# Patient Record
Sex: MALE | Race: OTHER | HISPANIC OR LATINO | URBAN - METROPOLITAN AREA
[De-identification: names, ages, dates, MRNs, and addresses within clinical notes are randomized per-mention and may not be internally consistent; named-entity substitution may affect disease eponyms.]

---

## 2020-07-29 VITALS
DIASTOLIC BLOOD PRESSURE: 83 MMHG | SYSTOLIC BLOOD PRESSURE: 120 MMHG | OXYGEN SATURATION: 98 % | RESPIRATION RATE: 16 BRPM | HEART RATE: 90 BPM | TEMPERATURE: 98 F | HEIGHT: 69 IN | WEIGHT: 315 LBS

## 2020-07-30 ENCOUNTER — INPATIENT (INPATIENT)
Facility: HOSPITAL | Age: 50
LOS: 0 days | Discharge: HOME | DRG: 621 | End: 2020-07-31
Attending: SURGERY | Admitting: SURGERY
Payer: COMMERCIAL

## 2020-07-30 LAB
HCT VFR BLD CALC: 43.5 % — SIGNIFICANT CHANGE UP (ref 39–50)
HGB BLD-MCNC: 14.3 G/DL — SIGNIFICANT CHANGE UP (ref 13–17)
MCHC RBC-ENTMCNC: 28 PG — SIGNIFICANT CHANGE UP (ref 27–34)
MCHC RBC-ENTMCNC: 32.9 GM/DL — SIGNIFICANT CHANGE UP (ref 32–36)
MCV RBC AUTO: 85.3 FL — SIGNIFICANT CHANGE UP (ref 80–100)
NRBC # BLD: 0 /100 WBCS — SIGNIFICANT CHANGE UP (ref 0–0)
PLATELET # BLD AUTO: 193 K/UL — SIGNIFICANT CHANGE UP (ref 150–400)
RBC # BLD: 5.1 M/UL — SIGNIFICANT CHANGE UP (ref 4.2–5.8)
RBC # FLD: 13.4 % — SIGNIFICANT CHANGE UP (ref 10.3–14.5)
WBC # BLD: 9.15 K/UL — SIGNIFICANT CHANGE UP (ref 3.8–10.5)
WBC # FLD AUTO: 9.15 K/UL — SIGNIFICANT CHANGE UP (ref 3.8–10.5)

## 2020-07-30 PROCEDURE — 88307 TISSUE EXAM BY PATHOLOGIST: CPT | Mod: 26

## 2020-07-30 RX ORDER — LOSARTAN POTASSIUM 100 MG/1
1 TABLET, FILM COATED ORAL
Qty: 0 | Refills: 0 | DISCHARGE

## 2020-07-30 RX ORDER — HYDROMORPHONE HYDROCHLORIDE 2 MG/ML
0.5 INJECTION INTRAMUSCULAR; INTRAVENOUS; SUBCUTANEOUS ONCE
Refills: 0 | Status: DISCONTINUED | OUTPATIENT
Start: 2020-07-30 | End: 2020-07-30

## 2020-07-30 RX ORDER — BUPIVACAINE 13.3 MG/ML
20 INJECTION, SUSPENSION, LIPOSOMAL INFILTRATION ONCE
Refills: 0 | Status: DISCONTINUED | OUTPATIENT
Start: 2020-07-30 | End: 2020-07-31

## 2020-07-30 RX ORDER — ENOXAPARIN SODIUM 100 MG/ML
40 INJECTION SUBCUTANEOUS ONCE
Refills: 0 | Status: COMPLETED | OUTPATIENT
Start: 2020-07-30 | End: 2020-07-30

## 2020-07-30 RX ORDER — KETOROLAC TROMETHAMINE 30 MG/ML
15 SYRINGE (ML) INJECTION EVERY 6 HOURS
Refills: 0 | Status: DISCONTINUED | OUTPATIENT
Start: 2020-07-30 | End: 2020-07-30

## 2020-07-30 RX ORDER — KETOROLAC TROMETHAMINE 30 MG/ML
15 SYRINGE (ML) INJECTION EVERY 6 HOURS
Refills: 0 | Status: DISCONTINUED | OUTPATIENT
Start: 2020-07-30 | End: 2020-07-31

## 2020-07-30 RX ORDER — ACETAMINOPHEN 500 MG
650 TABLET ORAL EVERY 6 HOURS
Refills: 0 | Status: DISCONTINUED | OUTPATIENT
Start: 2020-07-30 | End: 2020-07-31

## 2020-07-30 RX ORDER — ACETAMINOPHEN 500 MG
1000 TABLET ORAL ONCE
Refills: 0 | Status: COMPLETED | OUTPATIENT
Start: 2020-07-30 | End: 2020-07-30

## 2020-07-30 RX ORDER — PANTOPRAZOLE SODIUM 20 MG/1
40 TABLET, DELAYED RELEASE ORAL
Refills: 0 | Status: DISCONTINUED | OUTPATIENT
Start: 2020-07-30 | End: 2020-07-31

## 2020-07-30 RX ORDER — HEPARIN SODIUM 5000 [USP'U]/ML
7500 INJECTION INTRAVENOUS; SUBCUTANEOUS EVERY 8 HOURS
Refills: 0 | Status: DISCONTINUED | OUTPATIENT
Start: 2020-07-30 | End: 2020-07-31

## 2020-07-30 RX ORDER — HYDROMORPHONE HYDROCHLORIDE 2 MG/ML
0.5 INJECTION INTRAMUSCULAR; INTRAVENOUS; SUBCUTANEOUS
Refills: 0 | Status: DISCONTINUED | OUTPATIENT
Start: 2020-07-30 | End: 2020-07-30

## 2020-07-30 RX ORDER — HYOSCYAMINE SULFATE 0.13 MG
0.12 TABLET ORAL EVERY 6 HOURS
Refills: 0 | Status: DISCONTINUED | OUTPATIENT
Start: 2020-07-30 | End: 2020-07-31

## 2020-07-30 RX ORDER — LABETALOL HCL 100 MG
10 TABLET ORAL ONCE
Refills: 0 | Status: COMPLETED | OUTPATIENT
Start: 2020-07-30 | End: 2020-07-30

## 2020-07-30 RX ORDER — SCOPALAMINE 1 MG/3D
1 PATCH, EXTENDED RELEASE TRANSDERMAL ONCE
Refills: 0 | Status: COMPLETED | OUTPATIENT
Start: 2020-07-30 | End: 2020-07-30

## 2020-07-30 RX ORDER — ONDANSETRON 8 MG/1
4 TABLET, FILM COATED ORAL EVERY 6 HOURS
Refills: 0 | Status: DISCONTINUED | OUTPATIENT
Start: 2020-07-30 | End: 2020-07-31

## 2020-07-30 RX ORDER — SODIUM CHLORIDE 9 MG/ML
1000 INJECTION, SOLUTION INTRAVENOUS
Refills: 0 | Status: DISCONTINUED | OUTPATIENT
Start: 2020-07-30 | End: 2020-07-31

## 2020-07-30 RX ORDER — GABAPENTIN 400 MG/1
300 CAPSULE ORAL ONCE
Refills: 0 | Status: COMPLETED | OUTPATIENT
Start: 2020-07-30 | End: 2020-07-30

## 2020-07-30 RX ADMIN — HYDROMORPHONE HYDROCHLORIDE 0.5 MILLIGRAM(S): 2 INJECTION INTRAMUSCULAR; INTRAVENOUS; SUBCUTANEOUS at 13:16

## 2020-07-30 RX ADMIN — HYDROMORPHONE HYDROCHLORIDE 0.5 MILLIGRAM(S): 2 INJECTION INTRAMUSCULAR; INTRAVENOUS; SUBCUTANEOUS at 11:13

## 2020-07-30 RX ADMIN — Medication 0.12 MILLIGRAM(S): at 13:16

## 2020-07-30 RX ADMIN — HEPARIN SODIUM 7500 UNIT(S): 5000 INJECTION INTRAVENOUS; SUBCUTANEOUS at 21:36

## 2020-07-30 RX ADMIN — HYDROMORPHONE HYDROCHLORIDE 0.5 MILLIGRAM(S): 2 INJECTION INTRAMUSCULAR; INTRAVENOUS; SUBCUTANEOUS at 14:22

## 2020-07-30 RX ADMIN — HYDROMORPHONE HYDROCHLORIDE 0.5 MILLIGRAM(S): 2 INJECTION INTRAMUSCULAR; INTRAVENOUS; SUBCUTANEOUS at 10:25

## 2020-07-30 RX ADMIN — HYDROMORPHONE HYDROCHLORIDE 0.5 MILLIGRAM(S): 2 INJECTION INTRAMUSCULAR; INTRAVENOUS; SUBCUTANEOUS at 10:45

## 2020-07-30 RX ADMIN — Medication 15 MILLIGRAM(S): at 15:04

## 2020-07-30 RX ADMIN — Medication 15 MILLIGRAM(S): at 21:35

## 2020-07-30 RX ADMIN — HYDROMORPHONE HYDROCHLORIDE 0.5 MILLIGRAM(S): 2 INJECTION INTRAMUSCULAR; INTRAVENOUS; SUBCUTANEOUS at 11:00

## 2020-07-30 RX ADMIN — Medication 15 MILLIGRAM(S): at 14:52

## 2020-07-30 RX ADMIN — Medication 10 MILLIGRAM(S): at 11:45

## 2020-07-30 RX ADMIN — ENOXAPARIN SODIUM 40 MILLIGRAM(S): 100 INJECTION SUBCUTANEOUS at 06:46

## 2020-07-30 RX ADMIN — SCOPALAMINE 1 PATCH: 1 PATCH, EXTENDED RELEASE TRANSDERMAL at 20:37

## 2020-07-30 RX ADMIN — GABAPENTIN 300 MILLIGRAM(S): 400 CAPSULE ORAL at 06:47

## 2020-07-30 RX ADMIN — Medication 10 MILLIGRAM(S): at 12:42

## 2020-07-30 RX ADMIN — SCOPALAMINE 1 PATCH: 1 PATCH, EXTENDED RELEASE TRANSDERMAL at 06:46

## 2020-07-30 RX ADMIN — Medication 1000 MILLIGRAM(S): at 06:48

## 2020-07-30 RX ADMIN — Medication 15 MILLIGRAM(S): at 22:12

## 2020-07-30 NOTE — PROGRESS NOTE ADULT - SUBJECTIVE AND OBJECTIVE BOX
POST-OPERATIVE NOTE    Procedure: Laparoscopic sleeve gastrectomy and hiatal hernia repair    Diagnosis/Indication: Morbid obesity     Surgeon: Dr. Womack     S: Pt has no complaints. Denies CP, SOB, [place], calf tenderness. Pain controlled with medication.    O:  T(C): 36.1 (07-30-20 @ 12:30), Max: 36.3 (07-30-20 @ 10:15)  T(F): 97 (07-30-20 @ 12:30), Max: 97.4 (07-30-20 @ 10:15)  HR: 93 (07-30-20 @ 13:00) (83 - 93)  BP: 176/94 (07-30-20 @ 13:00) (161/78 - 180/110)  RR: 14 (07-30-20 @ 13:00) (11 - 29)  SpO2: 96% (07-30-20 @ 13:00) (96% - 99%)  Wt(kg): --            Gen: NAD, resting comfortably in bed  C/V: NSR  Pulm: Nonlabored breathing, no respiratory distress  Abd: Soft, non-distended, TTP around incision site, incision clean dry and intact     Extrem: WWP, no calf edema, SCDs in place

## 2020-07-30 NOTE — BRIEF OPERATIVE NOTE - NSICDXBRIEFPOSTOP_GEN_ALL_CORE_FT
POST-OP DIAGNOSIS:  Hiatal hernia 30-Jul-2020 10:09:52  Jaylen Hercules  Morbid obesity 30-Jul-2020 10:09:23  Jaylen Hercules

## 2020-07-30 NOTE — BRIEF OPERATIVE NOTE - NSICDXBRIEFPROCEDURE_GEN_ALL_CORE_FT
PROCEDURES:  Laparoscopic repair of sliding hiatal hernia 30-Jul-2020 10:09:39  Jaylen Hercules  Laparoscopic sleeve gastrectomy 30-Jul-2020 10:09:04  Jaylen Hercules

## 2020-07-30 NOTE — BRIEF OPERATIVE NOTE - OPERATION/FINDINGS
Hiatal hernia reduced and defect closed with PDS. Stomach divided along Bougie edge using EndoGIA stapler. Omentum sutured to greater curvature of sleeved stomach. Hemostasis achieved. Fascia at 15mm post site closed. Port sites closed w/ 4-0 Monocryl sutures & surgical glue.

## 2020-07-30 NOTE — PROGRESS NOTE ADULT - ASSESSMENT
49M Religion w/ hx HTN, SHENG (does not use CPAP), and MO who presents for LSG. Patient denies any acute complaints at this time. Refuses blood transfusions.    -Pain/nausea control  -BCLD, IVF  -Protonix  -CBC 6hrs post-op   -Hyoscyamine  .125 mg every 6 hours   -HSQ DVT ppx   -SCDs, OOB/A, IS  -AM labs  -Levsin   -Nutritional consult in AM

## 2020-07-30 NOTE — H&P ADULT - HISTORY OF PRESENT ILLNESS
49M Quaker w/ hx HTN, SHENG (does not use CPAP), and MO who presents for LSG. Patient denies any acute complaints at this time. Refuses blood transfusions.

## 2020-07-30 NOTE — PACU DISCHARGE NOTE - COMMENTS
pt aao x3.  VSS.  lap sites to abd x5 with dermabond intact.  denies c/o pain at present.  report given to RN on 9 wollman; pt to go to [facility]- via bed on monitor with RN and transport

## 2020-07-31 VITALS
DIASTOLIC BLOOD PRESSURE: 85 MMHG | RESPIRATION RATE: 18 BRPM | OXYGEN SATURATION: 99 % | HEART RATE: 74 BPM | SYSTOLIC BLOOD PRESSURE: 160 MMHG

## 2020-07-31 LAB
ANION GAP SERPL CALC-SCNC: 11 MMOL/L — SIGNIFICANT CHANGE UP (ref 5–17)
BUN SERPL-MCNC: 11 MG/DL — SIGNIFICANT CHANGE UP (ref 7–23)
CALCIUM SERPL-MCNC: 9 MG/DL — SIGNIFICANT CHANGE UP (ref 8.4–10.5)
CHLORIDE SERPL-SCNC: 103 MMOL/L — SIGNIFICANT CHANGE UP (ref 96–108)
CO2 SERPL-SCNC: 29 MMOL/L — SIGNIFICANT CHANGE UP (ref 22–31)
CREAT SERPL-MCNC: 0.88 MG/DL — SIGNIFICANT CHANGE UP (ref 0.5–1.3)
GLUCOSE SERPL-MCNC: 100 MG/DL — HIGH (ref 70–99)
HCT VFR BLD CALC: 40.5 % — SIGNIFICANT CHANGE UP (ref 39–50)
HGB BLD-MCNC: 13.2 G/DL — SIGNIFICANT CHANGE UP (ref 13–17)
MAGNESIUM SERPL-MCNC: 2.2 MG/DL — SIGNIFICANT CHANGE UP (ref 1.6–2.6)
MCHC RBC-ENTMCNC: 28.4 PG — SIGNIFICANT CHANGE UP (ref 27–34)
MCHC RBC-ENTMCNC: 32.6 GM/DL — SIGNIFICANT CHANGE UP (ref 32–36)
MCV RBC AUTO: 87.3 FL — SIGNIFICANT CHANGE UP (ref 80–100)
NRBC # BLD: 0 /100 WBCS — SIGNIFICANT CHANGE UP (ref 0–0)
PHOSPHATE SERPL-MCNC: 3 MG/DL — SIGNIFICANT CHANGE UP (ref 2.5–4.5)
PLATELET # BLD AUTO: 183 K/UL — SIGNIFICANT CHANGE UP (ref 150–400)
POTASSIUM SERPL-MCNC: 4.8 MMOL/L — SIGNIFICANT CHANGE UP (ref 3.5–5.3)
POTASSIUM SERPL-SCNC: 4.8 MMOL/L — SIGNIFICANT CHANGE UP (ref 3.5–5.3)
RBC # BLD: 4.64 M/UL — SIGNIFICANT CHANGE UP (ref 4.2–5.8)
RBC # FLD: 13.6 % — SIGNIFICANT CHANGE UP (ref 10.3–14.5)
SODIUM SERPL-SCNC: 143 MMOL/L — SIGNIFICANT CHANGE UP (ref 135–145)
WBC # BLD: 8.96 K/UL — SIGNIFICANT CHANGE UP (ref 3.8–10.5)
WBC # FLD AUTO: 8.96 K/UL — SIGNIFICANT CHANGE UP (ref 3.8–10.5)

## 2020-07-31 RX ORDER — HYOSCYAMINE SULFATE 0.13 MG
1 TABLET ORAL
Qty: 28 | Refills: 0
Start: 2020-07-31 | End: 2020-08-06

## 2020-07-31 RX ORDER — LOSARTAN POTASSIUM 100 MG/1
25 TABLET, FILM COATED ORAL DAILY
Refills: 0 | Status: DISCONTINUED | OUTPATIENT
Start: 2020-07-31 | End: 2020-07-31

## 2020-07-31 RX ORDER — ASPIRIN/CALCIUM CARB/MAGNESIUM 324 MG
1 TABLET ORAL
Qty: 30 | Refills: 0
Start: 2020-07-31 | End: 2020-08-29

## 2020-07-31 RX ORDER — ONDANSETRON 8 MG/1
1 TABLET, FILM COATED ORAL
Qty: 8 | Refills: 0
Start: 2020-07-31 | End: 2020-08-01

## 2020-07-31 RX ORDER — ACETAMINOPHEN 500 MG
1 TABLET ORAL
Qty: 16 | Refills: 0
Start: 2020-07-31 | End: 2020-08-03

## 2020-07-31 RX ORDER — PANTOPRAZOLE SODIUM 20 MG/1
1 TABLET, DELAYED RELEASE ORAL
Qty: 30 | Refills: 0
Start: 2020-07-31 | End: 2020-08-29

## 2020-07-31 RX ADMIN — HEPARIN SODIUM 7500 UNIT(S): 5000 INJECTION INTRAVENOUS; SUBCUTANEOUS at 06:34

## 2020-07-31 RX ADMIN — Medication 15 MILLIGRAM(S): at 03:04

## 2020-07-31 RX ADMIN — LOSARTAN POTASSIUM 25 MILLIGRAM(S): 100 TABLET, FILM COATED ORAL at 12:42

## 2020-07-31 RX ADMIN — PANTOPRAZOLE SODIUM 40 MILLIGRAM(S): 20 TABLET, DELAYED RELEASE ORAL at 06:34

## 2020-07-31 RX ADMIN — Medication 15 MILLIGRAM(S): at 08:46

## 2020-07-31 RX ADMIN — Medication 0.12 MILLIGRAM(S): at 06:34

## 2020-07-31 RX ADMIN — SCOPALAMINE 1 PATCH: 1 PATCH, EXTENDED RELEASE TRANSDERMAL at 07:55

## 2020-07-31 RX ADMIN — Medication 15 MILLIGRAM(S): at 03:40

## 2020-07-31 RX ADMIN — HEPARIN SODIUM 7500 UNIT(S): 5000 INJECTION INTRAVENOUS; SUBCUTANEOUS at 13:42

## 2020-07-31 RX ADMIN — Medication 15 MILLIGRAM(S): at 09:00

## 2020-07-31 RX ADMIN — Medication 15 MILLIGRAM(S): at 13:42

## 2020-07-31 RX ADMIN — Medication 0.12 MILLIGRAM(S): at 12:45

## 2020-07-31 RX ADMIN — Medication 0.12 MILLIGRAM(S): at 00:57

## 2020-07-31 RX ADMIN — Medication 15 MILLIGRAM(S): at 14:00

## 2020-07-31 NOTE — DISCHARGE NOTE PROVIDER - NSDCMRMEDTOKEN_GEN_ALL_CORE_FT
Adult Aspirin 81 mg oral delayed release tablet: 1 tab(s) orally once a day MDD:1  hyoscyamine 0.125 mg oral tablet: 1 tab(s) orally every 6 hours MDD:4  losartan 25 mg oral tablet: 1 tab(s) orally once a day  Protonix 40 mg oral delayed release tablet: 1 tab(s) orally once a day MDD:1  Tylenol 8 Hour 650 mg oral tablet, extended release: 1 tab(s) orally every 6 hours, As Needed - for moderate pain MDD:4  Zofran 4 mg oral tablet: 1 tab(s) orally every 6 hours, As Needed -for nausea MDD:4

## 2020-07-31 NOTE — DISCHARGE NOTE PROVIDER - CARE PROVIDER_API CALL
Shira Womack  SURGERY  [address] [place] [address] [address]  [place] [address]  Phone: [phone]  Fax: [phone]  Follow Up Time:     Jorge Middleton)  Internal Medicine  [address] [facility]  [address]  [place] [address]  Phone: [phone]  Fax: [phone]  Follow Up Time: 1 week

## 2020-07-31 NOTE — PROGRESS NOTE ADULT - ASSESSMENT
49M Pentecostal w/ hx HTN, SHENG (does not use CPAP), and MO who presents for LSG. Patient denies any acute complaints at this time. Refuses blood transfusions.    -Pain/nausea control  -BCLD, IVF  -Protonix  -CBC 6hrs post-op   -Hyoscyamine  .125 mg every 6 hours   -HSQ DVT ppx   -SCDs, OOB/A, IS  -AM labs  -Levsin

## 2020-07-31 NOTE — DISCHARGE NOTE PROVIDER - HOSPITAL COURSE
49M Islam w/ hx HTN, SHENG (does not use CPAP), and MO who presents for LSG.   Refuses blood transfusions. She underwent elective LSG the procedure was well tolerated uneventful, postoperatively the patient was advanced to CLD and tolerating well and ambulating adequately, on day of discharge patient was AVSS, tolerating diet without nausea or vomiting, ambulating, pain well controlled and stable for discharge home with outpatient follow up.

## 2020-07-31 NOTE — DISCHARGE NOTE PROVIDER - NSDCFUADDINST_GEN_ALL_CORE_FT
Follow up with Dr. Womack in 1 week; please call her office at your earliest convenience to make an appointment. You may shower; soap and water over incision sites. Do not scrub. Pat dry when done. No tub bathing or swimming until cleared. Keep incision sites out of the sun as scars will darken. No heavy lifting (>10lbs) or strenuous exercise. Diet: Continue clear liquid diet until cleared by Dr. Womack. Please drink at least 1oz. every 15 minutes (or 4oz per hour). Drink small sips throughout the day. Continue diet as outlined by paperwork received as a pre-operative patient. You should be urinating at least 3-4x per day. Call the office if you experience increasing abdominal pain, nausea, vomiting, or temperature >100.4F.  NO NSAIDs until approved by Dr. Womack. Avoid alcoholic beverages until cleared by Dr. Womack.    Please DO NOT take Hydrochlorothiazide until you see your PCP and Dr. Womack, you may continue on Losartan

## 2020-07-31 NOTE — DIETITIAN INITIAL EVALUATION ADULT. - OTHER INFO
49M with hx of  HTN, SHENG (does not use CPAP), and MO admitting for elective lap sleeve gastrectomy (7/30), now POD #1. On assessment, pt resting in bed. Currently on BARICLLIQ diet, tolerating PO. Pt had poor PO intake this morning, consuming 4-6oz prior to 1300 (1pm) as pt was sleeping all morning. Pain and nausea well controlled. Discussed volumes of various cup sizes on tray table and encouraged aiming for 4 oz/hr as tolerated. Prepared with protein shakes, and vitamins for after discharge. RD provided indepth edu on diet advancement and specific nutrient needs s/p LSG. Pt with very good support system at home. NKFA. No dietary restrictions at home. Skin: surgical incisions. GI: WDL per flowsheet. RD to follow up per protocol.

## 2020-07-31 NOTE — DIETITIAN INITIAL EVALUATION ADULT. - ENERGY NEEDS
Height: 69" Weight: 327lbs, IBW 160lbs+/-10%, %%, BMI 48.2 kg/m2  Above energy needs calculated for wt maintenance (20-25kcal/kg) using IBW (73kg)  Weeks 1-2 estimated needs: 730-880kcal/day (10-12kcal/kg), 88-110g pro/day (1.2-1.5g/kg), >/=64oz clear fluids.

## 2020-07-31 NOTE — DISCHARGE NOTE NURSING/CASE MANAGEMENT/SOCIAL WORK - PATIENT PORTAL LINK FT
You can access the FollowMyHealth Patient Portal offered by [facility] by registering at the following website: [email or web address]. By joining [facility]’s FollowMyHealth portal, you will also be able to view your health information using other applications (apps) compatible with our system.

## 2020-07-31 NOTE — PROGRESS NOTE ADULT - SUBJECTIVE AND OBJECTIVE BOX
SUBJECTIVE: Patient seen and examined bedside. Patient reports that she is feeling better from the chest pressure and epigastric pain, reports discomfort and fullness with diet, denies nausea or vomit. Patient is walking without issues, using IS and SCD. Denies specifically CP, SOB, calves tenderness.     heparin   Injectable 7500 Unit(s) SubCutaneous every 8 hours      Vital Signs Last 24 Hrs  T(C): 36.5 (31 Jul 2020 06:35), Max: 36.7 (30 Jul 2020 16:00)  T(F): 97.7 (31 Jul 2020 06:35), Max: 98.1 (30 Jul 2020 16:00)  HR: 83 (31 Jul 2020 08:30) (83 - 100)  BP: 163/90 (31 Jul 2020 08:30) (111/75 - 178/82)  BP(mean): 106 (31 Jul 2020 08:30) (99 - 127)  RR: 18 (31 Jul 2020 08:30) (11 - 29)  SpO2: 96% (31 Jul 2020 08:30) (96% - 100%)  I&O's Detail    30 Jul 2020 07:01  -  31 Jul 2020 07:00  --------------------------------------------------------  IN:    lactated ringers.: 3040 mL  Total IN: 3040 mL    OUT:    Voided: 1900 mL  Total OUT: 1900 mL    Total NET: 1140 mL          General: NAD, resting comfortably in bed  C/V: NSR  Pulm: Nonlabored breathing, no respiratory distress  Abd: soft, ND, incisions intact, clean, dry and w/o hematoma, no rebound or guarding.   Extrem: WWP, no edema, SCDs in place        LABS:                        13.2   8.96  )-----------( 183      ( 31 Jul 2020 06:39 )             40.5     07-31    143  |  103  |  11  ----------------------------<  100<H>  4.8   |  29  |  0.88    Ca    9.0      31 Jul 2020 06:39  Phos  3.0     07-31  Mg     2.2     07-31            RADIOLOGY & ADDITIONAL STUDIES:

## 2020-08-03 LAB — SURGICAL PATHOLOGY STUDY: SIGNIFICANT CHANGE UP

## 2020-08-04 PROCEDURE — 80048 BASIC METABOLIC PNL TOTAL CA: CPT

## 2020-08-04 PROCEDURE — C1889: CPT

## 2020-08-04 PROCEDURE — C9399: CPT

## 2020-08-04 PROCEDURE — 84100 ASSAY OF PHOSPHORUS: CPT

## 2020-08-04 PROCEDURE — 36415 COLL VENOUS BLD VENIPUNCTURE: CPT

## 2020-08-04 PROCEDURE — 83735 ASSAY OF MAGNESIUM: CPT

## 2020-08-04 PROCEDURE — 85027 COMPLETE CBC AUTOMATED: CPT

## 2020-08-04 PROCEDURE — C1781: CPT

## 2020-08-04 PROCEDURE — 88307 TISSUE EXAM BY PATHOLOGIST: CPT

## 2020-08-05 DIAGNOSIS — I10 ESSENTIAL (PRIMARY) HYPERTENSION: ICD-10-CM

## 2020-08-05 DIAGNOSIS — E66.01 MORBID (SEVERE) OBESITY DUE TO EXCESS CALORIES: ICD-10-CM

## 2020-08-05 DIAGNOSIS — K44.9 DIAPHRAGMATIC HERNIA WITHOUT OBSTRUCTION OR GANGRENE: ICD-10-CM

## 2020-08-05 DIAGNOSIS — Z53.1 PROCEDURE AND TREATMENT NOT CARRIED OUT BECAUSE OF PATIENT'S DECISION FOR REASONS OF BELIEF AND GROUP PRESSURE: ICD-10-CM

## 2020-08-05 DIAGNOSIS — G47.33 OBSTRUCTIVE SLEEP APNEA (ADULT) (PEDIATRIC): ICD-10-CM
